# Patient Record
(demographics unavailable — no encounter records)

---

## 2024-11-05 NOTE — HISTORY OF PRESENT ILLNESS
[FreeTextEntry1] : Patient is a 69-year-old here for routine gynecological follow-up without complaints. Patient had a mammogram and sonogram at [facility] 1 month ago. Patient colonoscopy is due in 3 years. The patient had a bone density and is being followed by endocrinologist and being treated with Prolia.

## 2024-11-05 NOTE — PHYSICAL EXAM
[Chaperone Present] : A chaperone was present in the examining room during all aspects of the physical examination [16148] : A chaperone was present during the pelvic exam. [Appropriately responsive] : appropriately responsive [Alert] : alert [No Acute Distress] : no acute distress [No Lymphadenopathy] : no lymphadenopathy [Soft] : soft [Non-tender] : non-tender [Non-distended] : non-distended [No HSM] : No HSM [No Lesions] : no lesions [No Mass] : no mass [Oriented x3] : oriented x3 [Labia Majora] : normal [Labia Minora] : normal [Normal] : normal [Uterine Adnexae] : normal [FreeTextEntry9] : Guaiac-negative no masses

## 2024-11-05 NOTE — DISCUSSION/SUMMARY
[FreeTextEntry1] : Patient had a normal exam. Pap, SBE I. Patient for routine follow-up and will bring in a copy of her mammogram report from [facility].